# Patient Record
Sex: MALE | Race: WHITE | URBAN - METROPOLITAN AREA
[De-identification: names, ages, dates, MRNs, and addresses within clinical notes are randomized per-mention and may not be internally consistent; named-entity substitution may affect disease eponyms.]

---

## 2024-09-26 ENCOUNTER — OFFICE VISIT (OUTPATIENT)
Dept: URGENT CARE | Age: 29
End: 2024-09-26
Payer: COMMERCIAL

## 2024-09-26 VITALS
RESPIRATION RATE: 16 BRPM | OXYGEN SATURATION: 99 % | TEMPERATURE: 98 F | WEIGHT: 190 LBS | DIASTOLIC BLOOD PRESSURE: 72 MMHG | SYSTOLIC BLOOD PRESSURE: 135 MMHG | HEART RATE: 53 BPM

## 2024-09-26 DIAGNOSIS — L73.9 FOLLICULITIS: Primary | ICD-10-CM

## 2024-09-26 RX ORDER — CEPHALEXIN 500 MG/1
500 CAPSULE ORAL 2 TIMES DAILY
Qty: 14 CAPSULE | Refills: 0 | Status: SHIPPED | OUTPATIENT
Start: 2024-09-26 | End: 2024-10-03

## 2024-09-27 NOTE — PROGRESS NOTES
"Subjective   Patient ID: Berry Machado is a 28 y.o. male. They present today with a chief complaint of Other (Pt has pimple like bump on left side of neck under ear, pt also has same pimple like bump on testicles.).    History of Present Illness  Pt presents with small \"pimple\" like bump behind the left ear and two on his left testicle. They do not hurt, no drainage, along hair line. Will sometimes trim testicle hair. No lymph node enlargement to left side of neck or groin. States has had for several months.  Most likely a folliculitis. No other associated symptoms or concerns to address. Denies STI concerns but do encourage him to have blood work done by PCP.           Past Medical History  Allergies as of 09/26/2024    (No Known Allergies)       (Not in a hospital admission)       History reviewed. No pertinent past medical history.    History reviewed. No pertinent surgical history.     reports that he has never smoked. He has never used smokeless tobacco.    Review of Systems  Review of Systems  10 point ROS completed and all are negative other than what is stated in the current HPI                               Objective    Vitals:    09/26/24 1912   BP: 135/72   Pulse: 53   Resp: 16   Temp: 36.7 °C (98 °F)   SpO2: 99%   Weight: 86.2 kg (190 lb)     No LMP for male patient.    Physical Exam  Vitals and nursing note reviewed. Exam conducted with a chaperone present.   Constitutional:       Appearance: Normal appearance.   Skin:     General: Skin is warm and dry.      Findings: Rash present.      Comments: Papule behind the left ear and two to the left testicle; no lymph node enlargement to the neck or groin.   Neurological:      Mental Status: He is alert.         Procedures    Point of Care Test & Imaging Results from this visit  No results found for this visit on 09/26/24.   No results found.    Diagnostic study results (if any) were reviewed by HUGH Parrish-KARIE.    Assessment/Plan   Allergies, " medications, history, and pertinent labs/EKGs/Imaging reviewed by MIKEY Parrish.     Medical Decision Making      Orders and Diagnoses  There are no diagnoses linked to this encounter.    Medical Admin Record      Patient disposition: Home    Electronically signed by MIKEY Parrish  9:21 PM

## 2024-09-27 NOTE — PATIENT INSTRUCTIONS
- Unclear etiology at this time  - Do not attempt to pop  - Encourage lab work by PCP  - Warm compress to area  - Avoid shaving the areas  - Follow-up with PCP in the next 5-7 days for re-evaluation

## 2025-06-14 ENCOUNTER — OFFICE VISIT (OUTPATIENT)
Dept: URGENT CARE | Age: 30
End: 2025-06-14
Payer: COMMERCIAL

## 2025-06-14 VITALS
DIASTOLIC BLOOD PRESSURE: 82 MMHG | SYSTOLIC BLOOD PRESSURE: 138 MMHG | WEIGHT: 190 LBS | TEMPERATURE: 98.4 F | HEART RATE: 58 BPM | RESPIRATION RATE: 18 BRPM | OXYGEN SATURATION: 98 %
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
## 2025-06-14 DIAGNOSIS — S40.861A TICK BITE OF RIGHT UPPER ARM, INITIAL ENCOUNTER: Primary | ICD-10-CM

## 2025-06-14 RX ORDER — DOXYCYCLINE 100 MG/1
100 CAPSULE ORAL 2 TIMES DAILY
Qty: 20 CAPSULE | Refills: 0 | Status: SHIPPED | OUTPATIENT
Start: 2025-06-14 | End: 2025-06-24

## 2025-06-14 RX ORDER — DOXYCYCLINE 100 MG/1
100 CAPSULE ORAL 2 TIMES DAILY
Qty: 20 CAPSULE | Refills: 0 | Status: SHIPPED | OUTPATIENT
Start: 2025-06-14 | End: 2025-06-14

## 2025-06-14 ASSESSMENT — PATIENT HEALTH QUESTIONNAIRE - PHQ9
SUM OF ALL RESPONSES TO PHQ9 QUESTIONS 1 & 2: 0
1. LITTLE INTEREST OR PLEASURE IN DOING THINGS: NOT AT ALL
2. FEELING DOWN, DEPRESSED OR HOPELESS: NOT AT ALL

## 2025-06-14 ASSESSMENT — PAIN SCALES - GENERAL: PAINLEVEL_OUTOF10: 0-NO PAIN

## 2025-06-14 NOTE — PROGRESS NOTES
Subjective   Patient ID: Berry Machado is a 29 y.o. male. They present today with a chief complaint of Insect Bite (Right arm/Possible tick bite /Noticed 3-4 days ago ).    History of Present Illness  Pt presents with bullseye like mendoza to this right upper biceps. Pt states notice initial mendoza to which he believes a tick bit him. The area has started to get bigger and wanted to be checked out. Pt denies joint pain or swollen lymph nodes at this time. No other associated symptoms or concerns to address at this time.           Past Medical History  Allergies as of 06/14/2025    (No Known Allergies)       Prescriptions Prior to Admission[1]     Medical History[2]    Surgical History[3]     reports that he has never smoked. He has never been exposed to tobacco smoke. He has never used smokeless tobacco.    Review of Systems  Review of Systems  10 point ROS completed and all are negative other than what is stated in the current HPI                               Objective    Vitals:    06/14/25 1847   BP: 138/82   BP Location: Left arm   Patient Position: Sitting   Pulse: 58   Resp: 18   Temp: 36.9 °C (98.4 °F)   SpO2: 98%   Weight: 86.2 kg (190 lb)     No LMP for male patient.    Physical Exam  Vitals and nursing note reviewed.   Constitutional:       Appearance: Normal appearance. He is not ill-appearing or toxic-appearing.   Cardiovascular:      Rate and Rhythm: Normal rate and regular rhythm.   Pulmonary:      Effort: Pulmonary effort is normal.      Breath sounds: Normal breath sounds.   Musculoskeletal:         General: Normal range of motion.   Lymphadenopathy:      Comments: Negative lymph node enlargement   Skin:     General: Skin is warm and dry.      Capillary Refill: Capillary refill takes less than 2 seconds.      Findings: Rash present.          Neurological:      Mental Status: He is alert and oriented to person, place, and time.   Psychiatric:         Behavior: Behavior normal.          Procedures    Point of Care Test & Imaging Results from this visit  No results found for this visit on 06/14/25.   Imaging  No results found.    Cardiology, Vascular, and Other Imaging  No other imaging results found for the past 2 days      Diagnostic study results (if any) were reviewed by MIKEY Parrish.    Assessment/Plan   Allergies, medications, history, and pertinent labs/EKGs/Imaging reviewed by MIKEY Parrish.     Medical Decision Making  Tick Bite:  - No FB noted in the right biceps area  - Bullseye to the right upper arm  - Start on Doxy x 10 days  - Discussed importance of follow-up with PCP in the next 7 days for re-evaluation    Orders and Diagnoses  There are no diagnoses linked to this encounter.    Medical Admin Record      Patient disposition: Home    Electronically signed by MIKEY Parrish  6:59 PM           [1] (Not in a hospital admission)  [2] No past medical history on file.  [3] No past surgical history on file.

## 2025-06-14 NOTE — PATIENT INSTRUCTIONS
Tick Bite:  - No FB noted in the right biceps area  - Bullseye to the right upper arm  - Start on Doxy x 10 days  - Discussed importance of follow-up with PCP in the next 7 days for re-evaluation